# Patient Record
Sex: FEMALE | Race: WHITE | URBAN - METROPOLITAN AREA
[De-identification: names, ages, dates, MRNs, and addresses within clinical notes are randomized per-mention and may not be internally consistent; named-entity substitution may affect disease eponyms.]

---

## 2020-11-25 ENCOUNTER — IMPORTED ENCOUNTER (OUTPATIENT)
Dept: URBAN - METROPOLITAN AREA CLINIC 50 | Facility: CLINIC | Age: 75
End: 2020-11-25

## 2020-12-31 ENCOUNTER — IMPORTED ENCOUNTER (OUTPATIENT)
Dept: URBAN - METROPOLITAN AREA CLINIC 50 | Facility: CLINIC | Age: 75
End: 2020-12-31

## 2021-04-18 ASSESSMENT — VISUAL ACUITY
OS_CC: 20/25-1
OD_PH: 20/20-1
OD_CC: 20/60

## 2021-04-18 ASSESSMENT — TONOMETRY
OS_IOP_MMHG: 12
OD_IOP_MMHG: 13

## 2023-05-19 NOTE — PATIENT DISCUSSION
ARTIFICIAL TEARS to affected eye(s) as needed.
BMI Within normal limits, continue current management.
Cataract DOES NOT appear visually significant.
DISCUSSED LASER VS PPV.
Does NOT APPEAR VISUALLY SIGNIFICANT.
ERM appears VISUALLY SIGNIFICANT.
ERM does NOT APPEAR VISUALLY SIGNIFICANT.
ETIOLOGY UNCLEAR.
LARGE CONTRAL VIT OPACITY, DISCUSSED OPTIONS. FOR NOW OBSERVE.
MONITOR response to TREATMENT.
My findings and recommendations are based on patient's symptoms, eye exam, diagnostic testing, and records.
No new retinal tears seen on exam.
No retinal tears or retinal detachment seen on clinical exam today. Reviewed the signs and symptoms of retinal tear/retinal detachment and the importance of calling for prompt evaluation should there be increasing floaters, new flashing lights, or decreasing peripheral vision in either eye at any time. Observation recommended.
Recommend OBSERVATION and continued MONITORING for progression.
Recommended OBSERVATION and MONITORING for progression.
Recommended OBSERVATION and continued MONITORING for progression.
Recommended observation.
TO FOLLOW.
VA 20/30.
Well lasered, retina attached.
None